# Patient Record
Sex: FEMALE | URBAN - METROPOLITAN AREA
[De-identification: names, ages, dates, MRNs, and addresses within clinical notes are randomized per-mention and may not be internally consistent; named-entity substitution may affect disease eponyms.]

---

## 2021-02-09 ENCOUNTER — NEW PATIENT (OUTPATIENT)
Dept: URBAN - METROPOLITAN AREA CLINIC 85 | Facility: CLINIC | Age: 76
End: 2021-02-09
Payer: OTHER GOVERNMENT

## 2021-02-09 PROCEDURE — 92004 COMPRE OPH EXAM NEW PT 1/>: CPT | Performed by: OPHTHALMOLOGY

## 2021-02-09 PROCEDURE — 92134 CPTRZ OPH DX IMG PST SGM RTA: CPT | Performed by: OPHTHALMOLOGY

## 2021-02-09 RX ORDER — KETOROLAC TROMETHAMINE 5 MG/ML
0.5 % SOLUTION OPHTHALMIC
Qty: 5 | Refills: 1 | Status: INACTIVE
Start: 2021-02-09 | End: 2021-05-17

## 2021-02-09 ASSESSMENT — KERATOMETRY
OD: 42.25
OS: 42.75

## 2021-02-09 ASSESSMENT — INTRAOCULAR PRESSURE
OD: 16
OS: 15

## 2021-02-09 ASSESSMENT — VISUAL ACUITY
OD: 20/40
OS: 20/100

## 2021-02-18 ENCOUNTER — ADULT PHYSICAL (OUTPATIENT)
Dept: URBAN - METROPOLITAN AREA CLINIC 85 | Facility: CLINIC | Age: 76
End: 2021-02-18
Payer: OTHER GOVERNMENT

## 2021-02-18 DIAGNOSIS — H25.13 AGE-RELATED NUCLEAR CATARACT, BILATERAL: ICD-10-CM

## 2021-02-18 PROCEDURE — 99203 OFFICE O/P NEW LOW 30 MIN: CPT | Performed by: PHYSICIAN ASSISTANT

## 2021-02-24 ENCOUNTER — SURGERY (OUTPATIENT)
Dept: URBAN - METROPOLITAN AREA SURGERY 55 | Facility: SURGERY | Age: 76
End: 2021-02-24
Payer: OTHER GOVERNMENT

## 2021-02-24 PROCEDURE — 66984 XCAPSL CTRC RMVL W/O ECP: CPT | Performed by: OPHTHALMOLOGY

## 2021-02-25 ENCOUNTER — POST OP (OUTPATIENT)
Dept: URBAN - METROPOLITAN AREA CLINIC 85 | Facility: CLINIC | Age: 76
End: 2021-02-25
Payer: COMMERCIAL

## 2021-02-25 PROCEDURE — 99024 POSTOP FOLLOW-UP VISIT: CPT | Performed by: OPTOMETRIST

## 2021-02-25 ASSESSMENT — INTRAOCULAR PRESSURE: OS: 12

## 2021-03-03 ENCOUNTER — POST OP (OUTPATIENT)
Dept: URBAN - METROPOLITAN AREA CLINIC 85 | Facility: CLINIC | Age: 76
End: 2021-03-03
Payer: COMMERCIAL

## 2021-03-03 PROCEDURE — 99024 POSTOP FOLLOW-UP VISIT: CPT | Performed by: OPHTHALMOLOGY

## 2021-03-03 ASSESSMENT — INTRAOCULAR PRESSURE
OD: 16
OS: 12

## 2021-03-03 ASSESSMENT — VISUAL ACUITY
OS: 20/40
OD: 20/40

## 2021-03-10 ENCOUNTER — SURGERY (OUTPATIENT)
Dept: URBAN - METROPOLITAN AREA SURGERY 55 | Facility: SURGERY | Age: 76
End: 2021-03-10
Payer: OTHER GOVERNMENT

## 2021-03-10 DIAGNOSIS — H35.371 PUCKERING OF MACULA, RIGHT EYE: ICD-10-CM

## 2021-03-10 DIAGNOSIS — H25.11 AGE-RELATED NUCLEAR CATARACT, RIGHT EYE: Primary | ICD-10-CM

## 2021-03-10 PROCEDURE — 66984 XCAPSL CTRC RMVL W/O ECP: CPT | Performed by: OPHTHALMOLOGY

## 2021-03-11 ENCOUNTER — POST OP (OUTPATIENT)
Dept: URBAN - METROPOLITAN AREA CLINIC 85 | Facility: CLINIC | Age: 76
End: 2021-03-11
Payer: COMMERCIAL

## 2021-03-11 PROCEDURE — 99024 POSTOP FOLLOW-UP VISIT: CPT | Performed by: PHYSICIAN ASSISTANT

## 2021-03-11 ASSESSMENT — INTRAOCULAR PRESSURE: OD: 16

## 2021-03-31 ENCOUNTER — POST-OPERATIVE VISIT (OUTPATIENT)
Dept: URBAN - METROPOLITAN AREA CLINIC 85 | Facility: CLINIC | Age: 76
End: 2021-03-31
Payer: COMMERCIAL

## 2021-03-31 DIAGNOSIS — Z96.1 PRESENCE OF INTRAOCULAR LENS: ICD-10-CM

## 2021-03-31 DIAGNOSIS — H52.4 PRESBYOPIA: Primary | ICD-10-CM

## 2021-03-31 PROCEDURE — 99024 POSTOP FOLLOW-UP VISIT: CPT | Performed by: OPHTHALMOLOGY

## 2021-03-31 ASSESSMENT — VISUAL ACUITY
OD: 20/20
OS: 20/40

## 2021-03-31 ASSESSMENT — INTRAOCULAR PRESSURE
OD: 14
OS: 13

## 2021-03-31 NOTE — IMPRESSION/PLAN
Impression: S/P CE/Standard IOL OD - 21 Days. Presence of intraocular lens  Z96.1. Post operative instructions reviewed -Discussed ERM findings with patient and recommend consult with retinal specialist for ERM eval and possible surgical intervention. Plan: RTC within the next 1-2 months for consult with retinal specialist.  Recommend holding off on new spectacle RX at this time until after retinal consult.

## 2021-05-17 ENCOUNTER — OFFICE VISIT (OUTPATIENT)
Dept: URBAN - METROPOLITAN AREA CLINIC 85 | Facility: CLINIC | Age: 76
End: 2021-05-17
Payer: OTHER GOVERNMENT

## 2021-05-17 DIAGNOSIS — H35.373 PUCKERING OF MACULA, BILATERAL: Primary | ICD-10-CM

## 2021-05-17 PROCEDURE — 92014 COMPRE OPH EXAM EST PT 1/>: CPT | Performed by: OPHTHALMOLOGY

## 2021-05-17 PROCEDURE — 92134 CPTRZ OPH DX IMG PST SGM RTA: CPT | Performed by: OPHTHALMOLOGY

## 2021-05-17 ASSESSMENT — INTRAOCULAR PRESSURE
OS: 13
OD: 14

## 2021-05-17 NOTE — IMPRESSION/PLAN
Impression: Puckering of macula, bilateral Plan: The findings were discussed with the patient and she has an ERM OU. The results were discussed with the patient along with the risks and benefits of a vitrectomy/membrane stripping OS and the patient will let us know if they would like to be scheduled for surgery.

## 2021-06-08 ENCOUNTER — ADULT PHYSICAL (OUTPATIENT)
Dept: URBAN - METROPOLITAN AREA CLINIC 85 | Facility: CLINIC | Age: 76
End: 2021-06-08
Payer: OTHER GOVERNMENT

## 2021-06-08 DIAGNOSIS — Z01.818 ENCOUNTER FOR OTHER PREPROCEDURAL EXAMINATION: Primary | ICD-10-CM

## 2021-06-08 PROCEDURE — 99213 OFFICE O/P EST LOW 20 MIN: CPT | Performed by: NURSE PRACTITIONER

## 2021-06-15 ENCOUNTER — SURGERY (OUTPATIENT)
Dept: URBAN - METROPOLITAN AREA SURGERY 55 | Facility: SURGERY | Age: 76
End: 2021-06-15
Payer: OTHER GOVERNMENT

## 2021-06-15 PROCEDURE — 67042 VIT FOR MACULAR HOLE: CPT | Performed by: OPHTHALMOLOGY

## 2021-06-15 RX ORDER — OFLOXACIN 3 MG/ML
0.3 % SOLUTION/ DROPS OPHTHALMIC
Qty: 5 | Refills: 0 | Status: INACTIVE
Start: 2021-06-15 | End: 2021-06-16

## 2021-06-15 RX ORDER — PREDNISOLONE ACETATE 10 MG/ML
1 % SUSPENSION/ DROPS OPHTHALMIC
Qty: 5 | Refills: 0 | Status: INACTIVE
Start: 2021-06-15 | End: 2021-06-16

## 2021-06-15 RX ORDER — HYDROCODONE BITARTRATE AND ACETAMINOPHEN 5; 325 MG/1; MG/1
TABLET ORAL
Qty: 10 | Refills: 0 | Status: ACTIVE
Start: 2021-06-15

## 2021-06-16 ENCOUNTER — POST-OPERATIVE VISIT (OUTPATIENT)
Dept: URBAN - METROPOLITAN AREA CLINIC 85 | Facility: CLINIC | Age: 76
End: 2021-06-16
Payer: OTHER GOVERNMENT

## 2021-06-16 DIAGNOSIS — Z48.810 ENCOUNTER FOR SURGICAL AFTERCARE FOLLOWING SURGERY ON A SENSE ORGAN: Primary | ICD-10-CM

## 2021-06-16 PROCEDURE — 99024 POSTOP FOLLOW-UP VISIT: CPT | Performed by: OPTOMETRIST

## 2021-06-16 RX ORDER — PREDNISOLONE ACETATE 10 MG/ML
1 % SUSPENSION/ DROPS OPHTHALMIC
Qty: 5 | Refills: 0 | Status: INACTIVE
Start: 2021-06-16 | End: 2022-01-10

## 2021-06-16 RX ORDER — OFLOXACIN 3 MG/ML
0.3 % SOLUTION/ DROPS OPHTHALMIC
Qty: 5 | Refills: 0 | Status: INACTIVE
Start: 2021-06-16 | End: 2022-01-10

## 2021-06-16 ASSESSMENT — INTRAOCULAR PRESSURE: OS: 12

## 2021-06-16 NOTE — IMPRESSION/PLAN
Impression: S/P Posterior Vitrectomy; MP OS - 1 Day. Encounter for surgical aftercare following surgery on a sense organ  Z48.810. Plan: As Scheduled  The patient was instructed to contact their eye care provider ASAP if there should be any decrease in vision, pain, or any worsening of condition.

## 2021-06-28 ENCOUNTER — OFFICE VISIT (OUTPATIENT)
Dept: URBAN - METROPOLITAN AREA CLINIC 85 | Facility: CLINIC | Age: 76
End: 2021-06-28
Payer: OTHER GOVERNMENT

## 2021-06-28 PROCEDURE — 99024 POSTOP FOLLOW-UP VISIT: CPT | Performed by: OPHTHALMOLOGY

## 2021-06-28 PROCEDURE — 92134 CPTRZ OPH DX IMG PST SGM RTA: CPT | Performed by: OPHTHALMOLOGY

## 2021-06-28 ASSESSMENT — INTRAOCULAR PRESSURE
OD: 14
OS: 22

## 2021-06-28 NOTE — IMPRESSION/PLAN
Impression: S/P Posterior Vitrectomy; MP OS - 1 Day. Encounter for surgical aftercare following surgery on a sense organ  Z48.810. Plan: The exam today shows that the macula is flattening and the retina is ATTACHED following the vitrectomy. She will taper off her drops and return to the clinic in 2 months for a dilated follow up and OCT.

## 2021-08-30 ENCOUNTER — OFFICE VISIT (OUTPATIENT)
Dept: URBAN - METROPOLITAN AREA CLINIC 85 | Facility: CLINIC | Age: 76
End: 2021-08-30
Payer: OTHER GOVERNMENT

## 2021-08-30 PROCEDURE — 92134 CPTRZ OPH DX IMG PST SGM RTA: CPT | Performed by: OPHTHALMOLOGY

## 2021-08-30 PROCEDURE — 99024 POSTOP FOLLOW-UP VISIT: CPT | Performed by: OPHTHALMOLOGY

## 2021-08-30 ASSESSMENT — INTRAOCULAR PRESSURE
OD: 14
OS: 17

## 2021-08-30 NOTE — IMPRESSION/PLAN
Impression: S/P Posterior Vitrectomy; MP OS - 1 Day. Encounter for surgical aftercare following surgery on a sense organ  Z48.810. Plan: The exam today shows that the macula is flattening and the retina is ATTACHED following the vitrectomy. She will taper off her drops and return to the clinic in 2-3 months for a dilated follow up and OCT.

## 2022-01-10 ENCOUNTER — OFFICE VISIT (OUTPATIENT)
Dept: URBAN - METROPOLITAN AREA CLINIC 85 | Facility: CLINIC | Age: 77
End: 2022-01-10
Payer: OTHER GOVERNMENT

## 2022-01-10 PROCEDURE — 92134 CPTRZ OPH DX IMG PST SGM RTA: CPT | Performed by: OPHTHALMOLOGY

## 2022-01-10 PROCEDURE — 99214 OFFICE O/P EST MOD 30 MIN: CPT | Performed by: OPHTHALMOLOGY

## 2022-01-10 ASSESSMENT — INTRAOCULAR PRESSURE
OD: 14
OS: 17

## 2022-01-10 NOTE — IMPRESSION/PLAN
Impression: Puckering of macula, bilateral
s/p PPVx ERMX OS Plan: The findings were discussed with the patient and she has an ERM OD. No treatment is required at this time. Reassured patient of current condition and treatment. Will continue to observe condition and or symptoms. 
RTC 6 mths estefani CERVANTES W/ [facility] for new MRx

## 2022-01-21 ENCOUNTER — OFFICE VISIT (OUTPATIENT)
Dept: URBAN - METROPOLITAN AREA CLINIC 85 | Facility: CLINIC | Age: 77
End: 2022-01-21
Payer: OTHER GOVERNMENT

## 2022-01-21 DIAGNOSIS — H52.03 HYPERMETROPIA, BILATERAL: ICD-10-CM

## 2022-01-21 PROCEDURE — 99212 OFFICE O/P EST SF 10 MIN: CPT | Performed by: OPTOMETRIST

## 2022-01-21 ASSESSMENT — INTRAOCULAR PRESSURE
OD: 16
OS: 16

## 2022-01-21 ASSESSMENT — VISUAL ACUITY
OS: 20/25
OD: 20/20

## 2022-01-21 NOTE — IMPRESSION/PLAN
Impression: Puckering of macula, right eye: H35.371. Plan: Discussed diagnosis in detail with patient.   See Dr. Sajan Gomez as scheduled